# Patient Record
Sex: MALE | Race: WHITE | NOT HISPANIC OR LATINO | Employment: OTHER | URBAN - METROPOLITAN AREA
[De-identification: names, ages, dates, MRNs, and addresses within clinical notes are randomized per-mention and may not be internally consistent; named-entity substitution may affect disease eponyms.]

---

## 2017-01-31 ENCOUNTER — OFFICE VISIT (OUTPATIENT)
Dept: FAMILY MEDICINE CLINIC | Facility: CLINIC | Age: 57
End: 2017-01-31

## 2017-01-31 VITALS
HEART RATE: 93 BPM | DIASTOLIC BLOOD PRESSURE: 82 MMHG | SYSTOLIC BLOOD PRESSURE: 118 MMHG | HEIGHT: 68 IN | BODY MASS INDEX: 29.61 KG/M2 | OXYGEN SATURATION: 98 % | WEIGHT: 195.4 LBS

## 2017-01-31 DIAGNOSIS — M54.41 CHRONIC RIGHT-SIDED LOW BACK PAIN WITH RIGHT-SIDED SCIATICA: ICD-10-CM

## 2017-01-31 DIAGNOSIS — I10 ESSENTIAL HYPERTENSION: Primary | ICD-10-CM

## 2017-01-31 DIAGNOSIS — G89.29 CHRONIC RIGHT-SIDED LOW BACK PAIN WITH RIGHT-SIDED SCIATICA: ICD-10-CM

## 2017-01-31 PROCEDURE — 99214 OFFICE O/P EST MOD 30 MIN: CPT | Performed by: INTERNAL MEDICINE

## 2017-01-31 RX ORDER — MELOXICAM 15 MG/1
15 TABLET ORAL DAILY
Qty: 30 TABLET | Refills: 0 | Status: SHIPPED | OUTPATIENT
Start: 2017-01-31 | End: 2017-02-28 | Stop reason: SDUPTHER

## 2017-01-31 NOTE — MR AVS SNAPSHOT
William Choi   1/31/2017 2:20 PM   Office Visit    Dept Phone:  [phone]   Encounter #:  [id number]    Provider:  Jonathan Brasher MD   Department:  [facility] INTERNAL MEDICINE                Your Full Care Plan              Today's Medication Changes          These changes are accurate as of: 1/31/17  3:18 PM.  If you have any questions, ask your nurse or doctor.               New Medication(s)Ordered:     meloxicam 15 MG tablet   Commonly known as:  MOBIC   Take 1 tablet by mouth Daily.   Started by:  Jonathan Brasher MD            Where to Get Your Medications      These medications were sent to [facility]/pharmacy #[id number] - [place], [place] - [address] [phone] [facility] [phone]   [address] [address]     Phone:  [phone]     meloxicam 15 MG tablet                  Your Updated Medication List          This list is accurate as of: 1/31/17  3:18 PM.  Always use your most recent med list.                amLODIPine 10 MG tablet   Commonly known as:  NORVASC   Take 1 tablet by mouth daily.       meloxicam 15 MG tablet   Commonly known as:  MOBIC   Take 1 tablet by mouth Daily.               You Were Diagnosed With        Codes Comments    Essential hypertension    -  Primary ICD-10-CM: I10  ICD-9-CM: 401.9     Chronic right-sided low back pain with right-sided sciatica     ICD-10-CM: M54.41, G89.29  ICD-9-CM: 724.2, 724.3, 338.29 Stretches as demonstrated today. MELOXICAM 15 mg daily for a month.  Continue the stretches thereafter.      Instructions     None    Patient Instructions History      Upcoming Appointments     Visit Type Date Time Department    OFFICE VISIT 1/31/2017  2:20 PM [facility]      [employer] Signup     Our records indicate that you have an active [employer] account.    You can view your After Visit Summary by going to [email or web address] and logging in with your [employer]  "username and password.  If you don't have a [employer] username and password but a parent or guardian has access to your record, the parent or guardian should login with their own [employer] username and password and access your record to view the After Visit Summary.    If you have questions, you can email [email or web address] or call [phone] to talk to our [employer] staff.  Remember, [employer] is NOT to be used for urgent needs.  For medical emergencies, dial 911.               Other Info from Your Visit           Allergies     No Known Allergies      Reason for Visit     Hypertension 8 month f/u    Leg Pain Right leg pain      Vital Signs     Blood Pressure Pulse Height Weight Oxygen Saturation Body Mass Index    118/82 (BP Location: Right arm, Patient Position: Sitting, Cuff Size: Adult) 93 68\" (172.7 cm) 195 lb 6.4 oz (88.6 kg) 98% 29.71 kg/m2    Smoking Status                   Never Smoker           Problems and Diagnoses Noted     High blood pressure    Chronic right-sided low back pain with right-sided sciatica            "

## 2017-02-28 DIAGNOSIS — M54.41 CHRONIC RIGHT-SIDED LOW BACK PAIN WITH RIGHT-SIDED SCIATICA: ICD-10-CM

## 2017-02-28 DIAGNOSIS — G89.29 CHRONIC RIGHT-SIDED LOW BACK PAIN WITH RIGHT-SIDED SCIATICA: ICD-10-CM

## 2017-02-28 RX ORDER — MELOXICAM 15 MG/1
TABLET ORAL
Qty: 30 TABLET | Refills: 1 | Status: SHIPPED | OUTPATIENT
Start: 2017-02-28 | End: 2017-07-31

## 2017-07-31 ENCOUNTER — OFFICE VISIT (OUTPATIENT)
Dept: FAMILY MEDICINE CLINIC | Facility: CLINIC | Age: 57
End: 2017-07-31

## 2017-07-31 VITALS
OXYGEN SATURATION: 97 % | HEART RATE: 84 BPM | BODY MASS INDEX: 29.77 KG/M2 | SYSTOLIC BLOOD PRESSURE: 122 MMHG | WEIGHT: 196.4 LBS | DIASTOLIC BLOOD PRESSURE: 84 MMHG | HEIGHT: 68 IN

## 2017-07-31 DIAGNOSIS — Z12.5 PROSTATE CANCER SCREENING: ICD-10-CM

## 2017-07-31 DIAGNOSIS — I10 ESSENTIAL HYPERTENSION: Primary | ICD-10-CM

## 2017-07-31 DIAGNOSIS — R31.29 HEMATURIA, MICROSCOPIC: ICD-10-CM

## 2017-07-31 DIAGNOSIS — R35.1 NOCTURIA: ICD-10-CM

## 2017-07-31 LAB
BILIRUB BLD-MCNC: NEGATIVE MG/DL
CLARITY, POC: CLEAR
COLOR UR: ABNORMAL
GLUCOSE UR STRIP-MCNC: NEGATIVE MG/DL
KETONES UR QL: NEGATIVE
LEUKOCYTE EST, POC: NEGATIVE
NITRITE UR-MCNC: NEGATIVE MG/ML
PH UR: 5.5 [PH] (ref 5–8)
PROT UR STRIP-MCNC: NEGATIVE MG/DL
RBC # UR STRIP: ABNORMAL /UL
SP GR UR: 1.02 (ref 1–1.03)
UROBILINOGEN UR QL: NORMAL

## 2017-07-31 PROCEDURE — 99214 OFFICE O/P EST MOD 30 MIN: CPT | Performed by: INTERNAL MEDICINE

## 2017-07-31 PROCEDURE — 81003 URINALYSIS AUTO W/O SCOPE: CPT | Performed by: INTERNAL MEDICINE

## 2017-07-31 NOTE — PROGRESS NOTES
"Chief Complaint   Patient presents with   • Hypertension     6 month f/u   • Blood in Urine     Found at DOT physical [employer]     HTN: Noted to have HTN at his DOT physical in 4/2016.  Amlodipine was started, has been tolerated, and has worked.    Hematuria: microscopic hematuria was noted at his DOT exam in late April. He denies any gross hematuria.  He denies rectal pain.  He denies any abdominal pain.  He does have intermittent urinary urgency and nocturia ×1.  He denies incontinence.  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    Her history is significant for prostate cancer in his father.    Current Outpatient Prescriptions:   •  amLODIPine (NORVASC) 10 MG tablet, Take 1 tablet by mouth daily., Disp: 90 tablet, Rfl: 3  No Known Allergies    He  reports that he has never smoked. He has never used smokeless tobacco. He reports that he drinks about 1.2 oz of alcohol per week  He reports that he does not use illicit drugs.    ROS: mild LLE edema (late-day); intermittent calf cramps; nocturia x 1; no irritative voiding symptoms and no incontinence.  No urinary hesitancy.  No TIA/CVA symptoms.  No angina or claudication.  No fever or chills.  No weight loss.    /84 (BP Location: Right arm, Patient Position: Sitting, Cuff Size: Adult)  Pulse 84  Ht 68\" (172.7 cm)  Wt 196 lb 6.4 oz (89.1 kg)  SpO2 97%  BMI 29.86 kg/m2    EXAM:    Well-developed, well nourished, in no acute distress.  Sclerae are anicteric and the conjunctivae are pink.  No thyromegaly or mass.  No carotid bruit.  No cervical, supraclavicular, or inguinal lymphadenopathy.  Regular rate and rhythm without murmur.  No S3 or S4.  Abdomen is mildly protuberant, but soft, nondistended, with normoactive bowel sounds and no abdominal bruit.  There is no evidence of hepatosplenomegaly or mass.  No CVA tenderness.  No palpable masses in the costovertebral angles.  Trace, nonpitting lower " extremity edema bilaterally.  Pedal pulses are full bilaterally.  No external hemorrhoids.  Normal sphincter tone.  Prostate is 2+ and nodular bilaterally.  He is nontender with rectal exam.     was seen today for hypertension and blood in urine.    Diagnoses and all orders for this visit:    Essential hypertension  -     Comprehensive Metabolic Panel    Hematuria, microscopic  -     POC Urinalysis Dipstick, Automated  -     Urinalysis, Microscopic Only  -     Urine Culture  -     Comprehensive Metabolic Panel  -     CT Abdomen Pelvis With & Without Contrast; Future    Nocturia    Prostate cancer screening  -     PSA      Continue the same dose of amlodipine.    Check the labs as ordered above.    Urinalysis today shows evidence of blood, but the rest of the UA is negative.  I await the additional microscopic and culture results.  Next    We will set in motion a CT scan of the abdomen and pelvis for evaluation of persistent hematuria.  We may ultimately need to refer him to urologist for cystoscopy as well.

## 2017-08-01 LAB
ALBUMIN SERPL-MCNC: 4.1 G/DL (ref 3.5–5.5)
ALBUMIN/GLOB SERPL: 1.5 {RATIO} (ref 1.2–2.2)
ALP SERPL-CCNC: 51 IU/L (ref 39–117)
ALT SERPL-CCNC: 23 IU/L (ref 0–44)
AST SERPL-CCNC: 26 IU/L (ref 0–40)
BILIRUB SERPL-MCNC: 0.4 MG/DL (ref 0–1.2)
BUN SERPL-MCNC: 18 MG/DL (ref 6–24)
BUN/CREAT SERPL: 17 (ref 9–20)
CALCIUM SERPL-MCNC: 8.8 MG/DL (ref 8.7–10.2)
CHLORIDE SERPL-SCNC: 99 MMOL/L (ref 96–106)
CO2 SERPL-SCNC: 22 MMOL/L (ref 18–29)
CREAT SERPL-MCNC: 1.06 MG/DL (ref 0.76–1.27)
GLOBULIN SER CALC-MCNC: 2.7 G/DL (ref 1.5–4.5)
GLUCOSE SERPL-MCNC: 120 MG/DL (ref 65–99)
POTASSIUM SERPL-SCNC: 4.1 MMOL/L (ref 3.5–5.2)
PROT SERPL-MCNC: 6.8 G/DL (ref 6–8.5)
PSA SERPL-MCNC: 0.7 NG/ML (ref 0–4)
SODIUM SERPL-SCNC: 138 MMOL/L (ref 134–144)

## 2017-08-02 ENCOUNTER — TELEPHONE (OUTPATIENT)
Dept: FAMILY MEDICINE CLINIC | Facility: CLINIC | Age: 57
End: 2017-08-02

## 2017-08-02 LAB
BACTERIA #/AREA URNS HPF: ABNORMAL /[HPF]
BACTERIA UR CULT: NO GROWTH
BACTERIA UR CULT: NORMAL
CRYSTALS URNS MICRO: ABNORMAL
EPI CELLS #/AREA URNS HPF: ABNORMAL /HPF
MUCOUS THREADS URNS QL MICRO: PRESENT
RBC #/AREA URNS HPF: ABNORMAL /HPF
UNIDENT CRYS URNS QL MICRO: PRESENT
WBC #/AREA URNS HPF: ABNORMAL /HPF

## 2017-08-02 NOTE — TELEPHONE ENCOUNTER
----- Message from Lisette Salgado MA sent at 8/2/2017  2:28 PM EDT -----  Regarding: FW: Patient results/ question  Contact: [phone]      ----- Message -----     From: Cheri Norfolk     Sent: 8/2/2017  11:42 AM       To: Lisette Salgado MA  Subject: Patient results/ question                        Patient called, would like lab results.   There is also imagining to be done. He would like to know why.    Thank you.

## 2017-08-03 DIAGNOSIS — E78.5 DYSLIPIDEMIA: Primary | ICD-10-CM

## 2017-08-09 DIAGNOSIS — I10 ESSENTIAL HYPERTENSION: ICD-10-CM

## 2017-08-09 RX ORDER — AMLODIPINE BESYLATE 10 MG/1
TABLET ORAL
Qty: 90 TABLET | Refills: 3 | Status: SHIPPED | OUTPATIENT
Start: 2017-08-09 | End: 2018-09-16 | Stop reason: SDUPTHER

## 2018-02-01 ENCOUNTER — OFFICE VISIT (OUTPATIENT)
Dept: FAMILY MEDICINE CLINIC | Facility: CLINIC | Age: 58
End: 2018-02-01

## 2018-02-01 VITALS
DIASTOLIC BLOOD PRESSURE: 76 MMHG | OXYGEN SATURATION: 99 % | HEIGHT: 68 IN | WEIGHT: 186 LBS | SYSTOLIC BLOOD PRESSURE: 114 MMHG | BODY MASS INDEX: 28.19 KG/M2 | HEART RATE: 91 BPM

## 2018-02-01 DIAGNOSIS — R31.29 HEMATURIA, MICROSCOPIC: ICD-10-CM

## 2018-02-01 DIAGNOSIS — I10 ESSENTIAL HYPERTENSION: Primary | ICD-10-CM

## 2018-02-01 DIAGNOSIS — J06.9 ACUTE URI: ICD-10-CM

## 2018-02-01 PROCEDURE — 99213 OFFICE O/P EST LOW 20 MIN: CPT | Performed by: INTERNAL MEDICINE

## 2018-02-01 NOTE — PATIENT INSTRUCTIONS
Mucinex 1200 mg twice a day with a full glass of water.  Delsym 2 teaspoons twice a day as needed for cough.

## 2018-02-01 NOTE — PROGRESS NOTES
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  Amlodipine was started, has been tolerated, and has worked.     Hematuria: microscopic hematuria was noted at his DOT exam in late April. He denies any gross hematuria.  He denies rectal pain.  He denies any abdominal pain.  He does have intermittent urinary urgency and nocturia ×1.  He denies incontinence.  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  CT scan in July, 2017 revealed peripelvic renal cysts bilaterally, but no calculi or masses.  Bladder was normal appearing.  Prostate was mildly enlarged.     His history is significant for prostate cancer in his father.    One week ago, he developed the acute onset of chest congestion.  The onset was following his going to the hospital with his mother in law.  Non-productie cough.  No head congestion.     Current Outpatient Prescriptions:   •  amLODIPine (NORVASC) 10 MG tablet, Take 1 tablet by mouth daily., Disp: 90 tablet, Rfl: 3    No Known Allergies    He  reports that he has never smoked. He has never used smokeless tobacco. He reports that he drinks about 1.2 oz of alcohol per week  He reports that he does not use illicit drugs.     ROS: mild LLE edema (late-day); intermittent calf cramps; nocturia x 1; no irritative voiding symptoms and no incontinence.  No urinary hesitancy.  No TIA/CVA symptoms.  No angina or claudication.  No fever or chills.  No weight loss.    /76 (BP Location: Left arm, Patient Position: Sitting, Cuff Size: Adult)  Pulse 91  Ht 172.7 cm (68\")  Wt 84.4 kg (186 lb)  SpO2 99%  BMI 28.28 kg/m2    Well-developed, well-nourished, in no acute distress.  Sclerae are anicteric and the conjunctivae are pink.  Oropharynx is unremarkable.  No cervical or supraclavicular lymphadenopathy.  Regular rate and rhythm.  No murmur.  No carotid bruit.  Clear to auscultation " bilaterally with good breath sounds throughout all lung fields.  No adventitious breath sounds.  No wheezes noted.  No abdominal bruit.  No lower extremity edema and pedal pulses are full bilaterally.         was seen today for hypertension, blood in urine and cough.    Diagnoses and all orders for this visit:    Essential hypertension    Hematuria, microscopic    Acute URI  Blood pressure is well controlled.  No change in amlodipine.    CT results were reviewed with the patient.  There were renal cysts, but no other abnormalities.  This will be rechecked with his DOT physical later this spring.  We will take a look at his renal function with blood work at a urine study when we see him back in August.    See patient instructions for recommendations for his upper respiratory infection.  He was given a prescription for Zithromax to take while on his upcoming trip if his symptoms worsen.  If systemic symptoms develop, he will call us, and we will change that ABX to Levaquin.

## 2018-06-28 ENCOUNTER — OFFICE VISIT (OUTPATIENT)
Dept: FAMILY MEDICINE CLINIC | Facility: CLINIC | Age: 58
End: 2018-06-28

## 2018-06-28 VITALS
BODY MASS INDEX: 28.26 KG/M2 | DIASTOLIC BLOOD PRESSURE: 68 MMHG | SYSTOLIC BLOOD PRESSURE: 112 MMHG | HEIGHT: 68 IN | OXYGEN SATURATION: 96 % | HEART RATE: 117 BPM | WEIGHT: 186.5 LBS

## 2018-06-28 DIAGNOSIS — M72.2 PLANTAR FASCIITIS OF LEFT FOOT: ICD-10-CM

## 2018-06-28 DIAGNOSIS — B35.1 ONYCHOMYCOSIS: ICD-10-CM

## 2018-06-28 DIAGNOSIS — J02.9 ACUTE PHARYNGITIS, UNSPECIFIED ETIOLOGY: Primary | ICD-10-CM

## 2018-06-28 PROCEDURE — 99214 OFFICE O/P EST MOD 30 MIN: CPT | Performed by: INTERNAL MEDICINE

## 2018-06-28 NOTE — PROGRESS NOTES
"Subjective   William Choi is a 58 y.o. male who presents today for:    Sore Throat (x 5-6 days - swollen uvula) and Pain (left heel)    History of Present Illness     Acute onset of posterior sore throat approximately 5 days ago that has persisted this week.  He has been taking Tylenol with temporary benefit.  He felt his uvula was swelling and beginning to affect his airway.    Mr. Choi  reports that he has never smoked. He has never used smokeless tobacco. He reports that he drinks about 1.2 oz of alcohol per week . He reports that he does not use drugs.     No Known Allergies    Current Outpatient Prescriptions:   •  amLODIPine (NORVASC) 10 MG tablet, TAKE 1 TABLET BY MOUTH DAILY., Disp: 90 tablet, Rfl: 3  •  Multiple Vitamins-Minerals (MENS 50+ MULTI VITAMIN/MIN PO), Take 1 tablet by mouth., Disp: , Rfl:       Review of Systems   Constitutional: Negative for chills and fever.   HENT: Positive for sore throat and voice change. Negative for postnasal drip.    Respiratory: Negative for cough.    Musculoskeletal: Negative for arthralgias and myalgias.         Objective   Vitals:    06/28/18 1457   BP: 112/68   BP Location: Right arm   Patient Position: Sitting   Cuff Size: Adult   Pulse: 117   SpO2: 96%   Weight: 84.6 kg (186 lb 8 oz)   Height: 172.7 cm (68\")     Physical Exam    Well-developed, well-nourished, in no acute distress.  Oropharynx shows moderate erythema and mild edema of the uvula with erythema of the aryepiglottic folds.  There are a few aphthous ulcerations on the uvula.  Tonsils are normal.  Tongue is coated but otherwise normal.  No cervical or supraclavicular lymphadenopathy appreciated.  Regular rate and rhythm.  Heart rate recheck: 88 bpm.   Dystrophic right great toenail consistent with fungal infection.  Tender at the anterior aspect of the left calcaneus.  No bruits appreciated.  No other abnormalities of the left foot or ankle appreciated.           was seen today for sore " throat and pain.    Diagnoses and all orders for this visit:    Acute pharyngitis, unspecified etiology  Comments:  Advil or Aleve for the inflammation.  Try the magic mouthwash for the symptoms.    Plantar fasciitis of left foot  Comments:  Stretches as demonstrated.  Ice for flares.    Onychomycosis  Comments:  Hold off the Rx for now; try OTC remedies.

## 2018-07-17 RX ORDER — AZITHROMYCIN 250 MG/1
TABLET, FILM COATED ORAL
Qty: 6 TABLET | Refills: 0 | Status: SHIPPED | OUTPATIENT
Start: 2018-07-17 | End: 2018-07-31

## 2018-07-17 RX ORDER — METHYLPREDNISOLONE 4 MG/1
TABLET ORAL
Qty: 21 TABLET | Refills: 0 | Status: SHIPPED | OUTPATIENT
Start: 2018-07-17 | End: 2018-07-31

## 2018-07-18 DIAGNOSIS — Z12.5 PROSTATE CANCER SCREENING: ICD-10-CM

## 2018-07-18 DIAGNOSIS — Z11.59 ENCOUNTER FOR HEPATITIS C SCREENING TEST FOR LOW RISK PATIENT: ICD-10-CM

## 2018-07-18 DIAGNOSIS — Z00.00 ROUTINE GENERAL MEDICAL EXAMINATION AT A HEALTH CARE FACILITY: Primary | ICD-10-CM

## 2018-07-22 ENCOUNTER — RESULTS ENCOUNTER (OUTPATIENT)
Dept: FAMILY MEDICINE CLINIC | Facility: CLINIC | Age: 58
End: 2018-07-22

## 2018-07-22 DIAGNOSIS — Z00.00 ROUTINE GENERAL MEDICAL EXAMINATION AT A HEALTH CARE FACILITY: ICD-10-CM

## 2018-07-22 DIAGNOSIS — Z11.59 ENCOUNTER FOR HEPATITIS C SCREENING TEST FOR LOW RISK PATIENT: ICD-10-CM

## 2018-07-22 DIAGNOSIS — Z12.5 PROSTATE CANCER SCREENING: ICD-10-CM

## 2018-07-31 ENCOUNTER — OFFICE VISIT (OUTPATIENT)
Dept: FAMILY MEDICINE CLINIC | Facility: CLINIC | Age: 58
End: 2018-07-31

## 2018-07-31 VITALS
TEMPERATURE: 98.5 F | OXYGEN SATURATION: 97 % | BODY MASS INDEX: 28.34 KG/M2 | WEIGHT: 187 LBS | HEART RATE: 96 BPM | HEIGHT: 68 IN | DIASTOLIC BLOOD PRESSURE: 92 MMHG | SYSTOLIC BLOOD PRESSURE: 124 MMHG

## 2018-07-31 DIAGNOSIS — R06.2 WHEEZE: ICD-10-CM

## 2018-07-31 DIAGNOSIS — R05.9 COUGH: Primary | ICD-10-CM

## 2018-07-31 PROCEDURE — 71046 X-RAY EXAM CHEST 2 VIEWS: CPT | Performed by: INTERNAL MEDICINE

## 2018-07-31 PROCEDURE — 99213 OFFICE O/P EST LOW 20 MIN: CPT | Performed by: INTERNAL MEDICINE

## 2018-07-31 RX ORDER — LEVOFLOXACIN 750 MG/1
750 TABLET ORAL DAILY
Qty: 7 TABLET | Refills: 0 | Status: SHIPPED | OUTPATIENT
Start: 2018-07-31 | End: 2020-11-18

## 2018-07-31 NOTE — PATIENT INSTRUCTIONS
Mucinex 1200 mg twice day with a full glass of water.    Stiolto inhaler-- 2 puffs once a day    Levofloxacin (antibiotic) once a day (NO milk or Multivitamins with it).

## 2018-07-31 NOTE — PROGRESS NOTES
"Chief Complaint   Patient presents with   • Cough       He was treated for an acute pharyngitis at the end of June, but his symptoms persisted.  We put him on a Z-Nathaniel and Medrol Dosepak 7/17/18 for persistent symptoms that also included a cough at that time.  These prescriptions did not help his cough to any great degree.  He now has a cough that is somewhat productive of yellow sputum.    Review of systems is positive for wheezing, cough.  No fever or chills.  No significant shortness of breath.  No head congestion or sore throat.      Current Outpatient Prescriptions:   •  amLODIPine (NORVASC) 10 MG tablet, TAKE 1 TABLET BY MOUTH DAILY., Disp: 90 tablet, Rfl: 3  •  Multiple Vitamins-Minerals (MENS 50+ MULTI VITAMIN/MIN PO), Take 1 tablet by mouth., Disp: , Rfl:     No Known Allergies    He  reports that he has never smoked. He has never used smokeless tobacco. He reports that he drinks about 1.2 oz of alcohol per week . He reports that he does not use drugs.      /92 (BP Location: Right arm, Patient Position: Sitting, Cuff Size: Large Adult)   Pulse 96   Temp 98.5 °F (36.9 °C) (Oral)   Ht 172.7 cm (68\")   Wt 84.8 kg (187 lb)   SpO2 97%   BMI 28.43 kg/m²     EXAM:    Well-developed, well-nourished, in no acute distress.  No cervical, supraclavicular, or axillary lymphadenopathy.  Regular rate and rhythm.  No murmur.  Wheezes bilaterally.  Rhonchi bilaterally.  Normal respiratory effort noted.  Oropharynx shows cobblestoning but no erythema.     was seen today for cough.    Diagnoses and all orders for this visit:    Cough  -     XR Chest PA & Lateral    Wheeze  -     XR Chest PA & Lateral    Other orders  -     levoFLOXacin (LEVAQUIN) 750 MG tablet; Take 1 tablet by mouth Daily.    2 images of the chest were obtained today.  The PA views are fairly clear, except for a small, well calcified nodule at the left base.  On the lateral views, there appears to be a posterior infiltrate.  Therefore, we " will treat with the Levaquin.  I await the radiologist's final interpretation.    Otherwise, see patient instructions.

## 2018-09-16 DIAGNOSIS — I10 ESSENTIAL HYPERTENSION: ICD-10-CM

## 2018-09-18 LAB
ALBUMIN SERPL-MCNC: 4.4 G/DL (ref 3.5–5.2)
ALBUMIN/GLOB SERPL: 1.9 G/DL
ALP SERPL-CCNC: 49 U/L (ref 39–117)
ALT SERPL-CCNC: 23 U/L (ref 1–41)
APPEARANCE UR: CLEAR
AST SERPL-CCNC: 20 U/L (ref 1–40)
BILIRUB SERPL-MCNC: 0.5 MG/DL (ref 0.1–1.2)
BILIRUB UR QL STRIP: NEGATIVE
BUN SERPL-MCNC: 17 MG/DL (ref 6–20)
BUN/CREAT SERPL: 16.7 (ref 7–25)
CALCIUM SERPL-MCNC: 8.9 MG/DL (ref 8.6–10.5)
CHLORIDE SERPL-SCNC: 102 MMOL/L (ref 98–107)
CHOLEST SERPL-MCNC: 185 MG/DL (ref 0–200)
CO2 SERPL-SCNC: 27.2 MMOL/L (ref 22–29)
COLOR UR: YELLOW
CREAT SERPL-MCNC: 1.02 MG/DL (ref 0.76–1.27)
ERYTHROCYTE [DISTWIDTH] IN BLOOD BY AUTOMATED COUNT: 12.9 % (ref 11.5–14.5)
GLOBULIN SER CALC-MCNC: 2.3 GM/DL
GLUCOSE SERPL-MCNC: 102 MG/DL (ref 65–99)
GLUCOSE UR QL: NEGATIVE
HCT VFR BLD AUTO: 44.8 % (ref 40.4–52.2)
HCV AB S/CO SERPL IA: <0.1 S/CO RATIO (ref 0–0.9)
HDLC SERPL-MCNC: 42 MG/DL (ref 40–60)
HGB BLD-MCNC: 15.1 G/DL (ref 13.7–17.6)
HGB UR QL STRIP: NEGATIVE
KETONES UR QL STRIP: ABNORMAL
LDLC SERPL CALC-MCNC: 89 MG/DL (ref 0–100)
LEUKOCYTE ESTERASE UR QL STRIP: NEGATIVE
MCH RBC QN AUTO: 31.7 PG (ref 27–32.7)
MCHC RBC AUTO-ENTMCNC: 33.7 G/DL (ref 32.6–36.4)
MCV RBC AUTO: 93.9 FL (ref 79.8–96.2)
NITRITE UR QL STRIP: NEGATIVE
PH UR STRIP: 5.5 [PH] (ref 5–8)
PLATELET # BLD AUTO: 167 10*3/MM3 (ref 140–500)
POTASSIUM SERPL-SCNC: 4.1 MMOL/L (ref 3.5–5.2)
PROT SERPL-MCNC: 6.7 G/DL (ref 6–8.5)
PROT UR QL STRIP: ABNORMAL
PSA SERPL-MCNC: 0.8 NG/ML (ref 0–4)
RBC # BLD AUTO: 4.77 10*6/MM3 (ref 4.6–6)
SODIUM SERPL-SCNC: 140 MMOL/L (ref 136–145)
SP GR UR: 1.03 (ref 1–1.03)
TRIGL SERPL-MCNC: 271 MG/DL (ref 0–150)
UROBILINOGEN UR STRIP-MCNC: ABNORMAL MG/DL
VLDLC SERPL CALC-MCNC: 54.2 MG/DL (ref 5–40)
WBC # BLD AUTO: 5.98 10*3/MM3 (ref 4.5–10.7)

## 2018-09-18 RX ORDER — AMLODIPINE BESYLATE 10 MG/1
TABLET ORAL
Qty: 90 TABLET | Refills: 3 | Status: SHIPPED | OUTPATIENT
Start: 2018-09-18 | End: 2020-11-18 | Stop reason: SDUPTHER

## 2020-11-18 ENCOUNTER — OFFICE VISIT (OUTPATIENT)
Dept: FAMILY MEDICINE CLINIC | Facility: CLINIC | Age: 60
End: 2020-11-18

## 2020-11-18 VITALS
SYSTOLIC BLOOD PRESSURE: 142 MMHG | WEIGHT: 181 LBS | HEART RATE: 86 BPM | TEMPERATURE: 98.4 F | DIASTOLIC BLOOD PRESSURE: 88 MMHG | OXYGEN SATURATION: 98 % | BODY MASS INDEX: 27.43 KG/M2 | HEIGHT: 68 IN

## 2020-11-18 DIAGNOSIS — R10.13 EPIGASTRIC PAIN: Primary | ICD-10-CM

## 2020-11-18 DIAGNOSIS — I10 ESSENTIAL HYPERTENSION: ICD-10-CM

## 2020-11-18 PROCEDURE — 99214 OFFICE O/P EST MOD 30 MIN: CPT | Performed by: NURSE PRACTITIONER

## 2020-11-18 RX ORDER — AMLODIPINE BESYLATE 10 MG/1
10 TABLET ORAL DAILY
Qty: 90 TABLET | Refills: 1 | Status: SHIPPED | OUTPATIENT
Start: 2020-11-18

## 2020-11-18 NOTE — PROGRESS NOTES
Subjective   William Choi is a 60 y.o. male.     Chief Complaint   Patient presents with   • Pain     under rib when laying down   • Hypertension     Pain  This is a new problem. The current episode started in the past 7 days. The problem occurs intermittently. The problem has been waxing and waning. Associated symptoms include fatigue. Pertinent negatives include no abdominal pain, chills, nausea or vomiting. Exacerbated by: he thinks his late night eating is aggravating him. Treatments tried: TUMS. The treatment provided no relief.        I have reviewed the patient's medical history in detail and updated the computerized patient record.    The following portions of the patient's history were reviewed and updated as appropriate: allergies, current medications, past family history, past medical history, past social history, past surgical history and problem list.      Current Outpatient Medications:   •  amLODIPine (NORVASC) 10 MG tablet, TAKE 1 TABLET BY MOUTH DAILY., Disp: 90 tablet, Rfl: 3  •  Multiple Vitamins-Minerals (MENS 50+ MULTI VITAMIN/MIN PO), Take 1 tablet by mouth Daily., Disp: , Rfl:      Review of Systems   Constitutional: Positive for fatigue. Negative for chills.   Respiratory: Negative.    Cardiovascular: Negative.    Gastrointestinal: Negative for abdominal distention, abdominal pain, nausea, vomiting, GERD and indigestion.   Genitourinary: Negative.    Musculoskeletal: Negative.    Neurological: Negative.    Psychiatric/Behavioral: Negative.        Objective    Vitals:    11/18/20 1148   BP: 142/88   Pulse: 86   Temp: 98.4 °F (36.9 °C)   SpO2: 98%     Body mass index is 27.52 kg/m².     Physical Exam  Vitals signs reviewed.   Constitutional:       Appearance: Normal appearance.   Cardiovascular:      Rate and Rhythm: Normal rate and regular rhythm.      Pulses: Normal pulses.      Heart sounds: Normal heart sounds.   Pulmonary:      Effort: Pulmonary effort is normal.      Breath sounds:  Normal breath sounds.   Abdominal:      General: Bowel sounds are decreased.      Palpations: Abdomen is soft. There is no hepatomegaly or splenomegaly.      Tenderness: There is abdominal tenderness in the epigastric area. Negative signs include Tolentino's sign and McBurney's sign.   Skin:     General: Skin is warm and dry.   Neurological:      Mental Status: He is alert and oriented to person, place, and time.           Assessment/Plan   Diagnoses and all orders for this visit:    1. Epigastric pain (Primary)  -     US Gallbladder      Mr. Choi presents with complaints of epigastric pain that occurs during the early morning hours and wakes him from his sleep. Yesterday he reported a severe episode of pain. He does still have his gallbladder. He does have tenderness with palpation in the epigastric quadrant of his abdomin.   I will send him for an ultrasound of his gallbladder for further evaluation.  I have advised him to monitor the foods he is eating to see if any particular food triggers an episode.   Follow up in 6 months for an annual physical exam with labs the week before.

## 2020-11-18 NOTE — TELEPHONE ENCOUNTER
Caller: William Choi    Relationship: Self    Best call back number: [phone]    Medication needed:   Requested Prescriptions     Pending Prescriptions Disp Refills   • amLODIPine (NORVASC) 10 MG tablet 90 tablet 3     Sig: Take 1 tablet by mouth Daily.       When do you need the refill by:     What details did the patient provide when requesting the medication: 11/19/20    Does the patient have less than a 3 day supply:  [x] Yes  [] No    What is the patient's preferred pharmacy: [facility]/PHARMACY #[id number] [place], [place] - [address] [phone] [facility] [phone] FX

## 2020-11-20 ENCOUNTER — HOSPITAL ENCOUNTER (OUTPATIENT)
Dept: ULTRASOUND IMAGING | Facility: HOSPITAL | Age: 60
Discharge: HOME | End: 2020-11-20
Admitting: NURSE PRACTITIONER

## 2020-11-20 PROCEDURE — 76705 ECHO EXAM OF ABDOMEN: CPT

## 2020-11-23 ENCOUNTER — TELEPHONE (OUTPATIENT)
Dept: FAMILY MEDICINE CLINIC | Facility: CLINIC | Age: 60
End: 2020-11-23

## 2020-11-23 NOTE — TELEPHONE ENCOUNTER
----- Message from JOANA Loza sent at 11/23/2020 10:30 AM EST -----  Please let him know that the ultrasound of his gallbladder is normal. If he is still having epigastric pain that wakes him at night we can send him for an EGD.

## 2020-11-23 NOTE — PROGRESS NOTES
Please let him know that the ultrasound of his gallbladder is normal. If he is still having epigastric pain that wakes him at night we can send him for an EGD.

## 2020-11-23 NOTE — TELEPHONE ENCOUNTER
Called patient and gave him the results.  Patient stated that he has not had the episode of pain since.  He will monitor his symptoms and call us if they return.

## 2020-12-11 ENCOUNTER — TELEPHONE (OUTPATIENT)
Dept: FAMILY MEDICINE CLINIC | Facility: CLINIC | Age: 60
End: 2020-12-11

## 2020-12-11 DIAGNOSIS — R10.11 RUQ ABDOMINAL PAIN: Primary | ICD-10-CM

## 2020-12-16 ENCOUNTER — OFFICE VISIT (OUTPATIENT)
Dept: FAMILY MEDICINE CLINIC | Facility: CLINIC | Age: 60
End: 2020-12-16

## 2020-12-16 VITALS
HEART RATE: 87 BPM | HEIGHT: 68 IN | WEIGHT: 181 LBS | BODY MASS INDEX: 27.43 KG/M2 | OXYGEN SATURATION: 97 % | TEMPERATURE: 97.3 F | DIASTOLIC BLOOD PRESSURE: 80 MMHG | SYSTOLIC BLOOD PRESSURE: 124 MMHG

## 2020-12-16 DIAGNOSIS — H65.93 FLUID LEVEL BEHIND TYMPANIC MEMBRANE OF BOTH EARS: ICD-10-CM

## 2020-12-16 DIAGNOSIS — H93.13 TINNITUS OF BOTH EARS: Primary | ICD-10-CM

## 2020-12-16 PROCEDURE — 99213 OFFICE O/P EST LOW 20 MIN: CPT | Performed by: NURSE PRACTITIONER

## 2020-12-16 NOTE — PROGRESS NOTES
Subjective   William Choi is a 60 y.o. male.     Chief Complaint   Patient presents with   • Tinnitus     for about 2 weeks     Tinnitus  This is a new problem. The current episode started 1 to 4 weeks ago. The problem occurs constantly. The problem has been unchanged. Pertinent negatives include no congestion, headaches or vertigo. Associated symptoms comments: No hearing loss, no ear fullness. Nothing aggravates the symptoms. He has tried nothing for the symptoms. The treatment provided no relief.        I have reviewed the patient's medical history in detail and updated the computerized patient record.    The following portions of the patient's history were reviewed and updated as appropriate: allergies, current medications, past family history, past medical history, past social history, past surgical history and problem list.      Current Outpatient Medications:   •  amLODIPine (NORVASC) 10 MG tablet, Take 1 tablet by mouth Daily., Disp: 90 tablet, Rfl: 1  •  Multiple Vitamins-Minerals (MENS 50+ MULTI VITAMIN/MIN PO), Take 1 tablet by mouth Daily., Disp: , Rfl:      Review of Systems   Constitutional: Negative.    HENT: Positive for tinnitus. Negative for congestion, ear discharge, ear pain, postnasal drip, rhinorrhea and sinus pressure.    Respiratory: Negative.    Cardiovascular: Negative.    Neurological: Negative.  Negative for dizziness, vertigo, syncope and light-headedness.   Psychiatric/Behavioral: Negative.        Objective    Vitals:    12/16/20 1434   BP: 124/80   Pulse: 87   Temp: 97.3 °F (36.3 °C)   SpO2: 97%     Physical Exam  Vitals signs reviewed.   Constitutional:       Appearance: Normal appearance.   HENT:      Right Ear: Ear canal and external ear normal. A middle ear effusion is present. Tympanic membrane is not erythematous.      Left Ear: Ear canal and external ear normal. A middle ear effusion is present. Tympanic membrane is not erythematous.      Ears:      Munroe exam findings: does  not lateralize.  Neurological:      Mental Status: He is alert.           Assessment/Plan   Diagnoses and all orders for this visit:    1. Tinnitus of both ears (Primary)    2. Fluid level behind tympanic membrane of both ears    Mr. Choi presents today with complaints of ringing in both ears that has been going on for several weeks. He dose not have vertigo or is on any medications which might cause the ringing in his ears. He does have some mid ear effusion without signs of infection. He is to start taking an OTC antihistamine. If his symptoms do not resolve I will send him to an ENT for further evaluation.

## 2020-12-29 ENCOUNTER — TRANSCRIBE ORDERS (OUTPATIENT)
Dept: SLEEP MEDICINE | Facility: HOSPITAL | Age: 60
End: 2020-12-29

## 2020-12-29 ENCOUNTER — OFFICE VISIT (OUTPATIENT)
Dept: GASTROENTEROLOGY | Facility: CLINIC | Age: 60
End: 2020-12-29

## 2020-12-29 VITALS — WEIGHT: 183 LBS | TEMPERATURE: 97.8 F | BODY MASS INDEX: 27.74 KG/M2 | HEIGHT: 68 IN

## 2020-12-29 DIAGNOSIS — R10.13 EPIGASTRIC PAIN: Primary | ICD-10-CM

## 2020-12-29 DIAGNOSIS — Z01.818 OTHER SPECIFIED PRE-OPERATIVE EXAMINATION: Primary | ICD-10-CM

## 2020-12-29 PROCEDURE — 99204 OFFICE O/P NEW MOD 45 MIN: CPT | Performed by: INTERNAL MEDICINE

## 2020-12-29 RX ORDER — PANTOPRAZOLE SODIUM 40 MG/1
40 TABLET, DELAYED RELEASE ORAL DAILY
Qty: 90 TABLET | Refills: 3 | Status: SHIPPED | OUTPATIENT
Start: 2020-12-29

## 2020-12-29 NOTE — PROGRESS NOTES
Chief Complaint   Patient presents with   • Abdominal Pain     William Choi is a 60 y.o. male who presents with a history of epigastric pain  HPI     Patient 60-year-old male with history of essential hypertension here to evaluate abdominal pain.  Patient reports months of recurrent epigastric pain worse first thing in the morning which does seem to improve through the day.  Patient sometimes reports the pain can be severe at times wakes up and just lays there waiting for it to resolve.  Patient has had less severe episodes in the past which she treated with Tums to good effect but the Tums no longer seems to be working.  Patient denies any melena no bright red blood per rectum has not lost weight but still having ongoing symptoms.  Patient reports ultrasound was negative now here for further recommendations.    Past Medical History:   Diagnosis Date   • Essential hypertension 5/3/2016       Current Outpatient Medications:   •  amLODIPine (NORVASC) 10 MG tablet, Take 1 tablet by mouth Daily., Disp: 90 tablet, Rfl: 1  •  Loratadine (CLARITIN PO), Take 1 tablet by mouth Daily., Disp: , Rfl:   •  Multiple Vitamins-Minerals (MENS 50+ MULTI VITAMIN/MIN PO), Take 1 tablet by mouth Daily., Disp: , Rfl:   •  pantoprazole (PROTONIX) 40 MG EC tablet, Take 1 tablet by mouth Daily., Disp: 90 tablet, Rfl: 3  No Known Allergies  Social History     Socioeconomic History   • Marital status:      Spouse name: Not on file   • Number of children: Not on file   • Years of education: Not on file   • Highest education level: Not on file   Tobacco Use   • Smoking status: Never Smoker   • Smokeless tobacco: Never Used   Substance and Sexual Activity   • Alcohol use: Yes     Alcohol/week: 2.0 standard drinks     Types: 2 Cans of beer per week   • Drug use: No     Family History   Problem Relation Age of Onset   • COPD Mother    • Aplastic anemia Father    • Hypertension Father    • Kidney failure Father    • Prostate cancer Father     • Hypertension Brother    • Colon cancer Paternal Great-Grandfather      Review of Systems   Constitutional: Negative.    HENT: Negative.    Eyes: Negative.    Respiratory: Negative.    Cardiovascular: Negative.    Gastrointestinal: Positive for abdominal pain. Negative for abdominal distention, anal bleeding, blood in stool, constipation, diarrhea, nausea, rectal pain and vomiting.   Endocrine: Negative.    Musculoskeletal: Negative.    Skin: Negative.    Allergic/Immunologic: Negative.    Hematological: Negative.      Vitals:    12/29/20 1450   Temp: 97.8 °F (36.6 °C)     Physical Exam  Vitals signs and nursing note reviewed.   Constitutional:       Appearance: Normal appearance. He is well-developed and normal weight.   HENT:      Head: Normocephalic and atraumatic.   Eyes:      General: No scleral icterus.     Conjunctiva/sclera: Conjunctivae normal.      Pupils: Pupils are equal, round, and reactive to light.   Neck:      Musculoskeletal: Normal range of motion and neck supple. No neck rigidity.      Trachea: No tracheal deviation.   Cardiovascular:      Rate and Rhythm: Normal rate and regular rhythm.      Heart sounds: No murmur. No friction rub. No gallop.    Pulmonary:      Effort: Pulmonary effort is normal. No respiratory distress.      Breath sounds: Normal breath sounds. No wheezing or rales.   Abdominal:      General: Bowel sounds are normal. There is no distension.      Palpations: Abdomen is soft. There is no mass.      Tenderness: There is no abdominal tenderness. There is no guarding or rebound.   Musculoskeletal: Normal range of motion.         General: No swelling or tenderness.   Skin:     General: Skin is warm and dry.      Coloration: Skin is not jaundiced.      Findings: No rash.   Neurological:      General: No focal deficit present.      Mental Status: He is alert and oriented to person, place, and time.      Cranial Nerves: No cranial nerve deficit.   Psychiatric:         Behavior:  Behavior normal.         Thought Content: Thought content normal.       Diagnoses and all orders for this visit:    Epigastric pain  -     Case Request; Standing  -     Implement Anesthesia orders day of procedure.; Standing  -     Obtain informed consent; Standing  -     Case Request    Other orders  -     Loratadine (CLARITIN PO); Take 1 tablet by mouth Daily.  -     pantoprazole (PROTONIX) 40 MG EC tablet; Take 1 tablet by mouth Daily.    Patient 60-year-old male with history of hypertension presenting with several months of epigastric pain.  Patient reports usually wakes up in the morning with discomfort but as the day goes on does start to feel little better.  Patient reports used to be treat these discomforts with Tums which resolved his complaints but it no longer seems to work.  Patient denies nausea vomiting no melena or bright red blood per rectum.  Patient otherwise feeling well here for further recommendations.  Patient reports underwent ultrasound of the gallbladder which was normal but has not tried any medication other than the Tums for his symptoms.  For now will give trial Protonix and arrange EGD to evaluate for peptic disease.  We will follow-up clinically based on findings.

## 2021-01-02 ENCOUNTER — LAB (OUTPATIENT)
Dept: LAB | Facility: HOSPITAL | Age: 61
End: 2021-01-02

## 2021-01-02 DIAGNOSIS — Z01.818 OTHER SPECIFIED PRE-OPERATIVE EXAMINATION: ICD-10-CM

## 2021-01-02 LAB — SARS-COV-2 ORF1AB RESP QL NAA+PROBE: NOT DETECTED

## 2021-01-02 PROCEDURE — U0004 COV-19 TEST NON-CDC HGH THRU: HCPCS

## 2021-01-02 PROCEDURE — C9803 HOPD COVID-19 SPEC COLLECT: HCPCS

## 2021-01-04 ENCOUNTER — ANESTHESIA (OUTPATIENT)
Dept: GASTROENTEROLOGY | Facility: HOSPITAL | Age: 61
End: 2021-01-04

## 2021-01-04 ENCOUNTER — ANESTHESIA EVENT (OUTPATIENT)
Dept: GASTROENTEROLOGY | Facility: HOSPITAL | Age: 61
End: 2021-01-04

## 2021-01-04 ENCOUNTER — HOSPITAL ENCOUNTER (OUTPATIENT)
Facility: HOSPITAL | Age: 61
Setting detail: HOSPITAL OUTPATIENT SURGERY
Discharge: HOME | End: 2021-01-04
Attending: INTERNAL MEDICINE | Admitting: INTERNAL MEDICINE

## 2021-01-04 VITALS
RESPIRATION RATE: 16 BRPM | HEART RATE: 69 BPM | HEIGHT: 68 IN | SYSTOLIC BLOOD PRESSURE: 105 MMHG | TEMPERATURE: 98.3 F | OXYGEN SATURATION: 98 % | DIASTOLIC BLOOD PRESSURE: 60 MMHG | WEIGHT: 182.7 LBS | BODY MASS INDEX: 27.69 KG/M2

## 2021-01-04 DIAGNOSIS — R10.13 EPIGASTRIC PAIN: ICD-10-CM

## 2021-01-04 PROCEDURE — 25010000002 PROPOFOL 10 MG/ML EMULSION: Performed by: ANESTHESIOLOGY

## 2021-01-04 PROCEDURE — 88305 TISSUE EXAM BY PATHOLOGIST: CPT | Performed by: INTERNAL MEDICINE

## 2021-01-04 PROCEDURE — 43239 EGD BIOPSY SINGLE/MULTIPLE: CPT | Performed by: INTERNAL MEDICINE

## 2021-01-04 RX ORDER — SODIUM CHLORIDE 0.9 % (FLUSH) 0.9 %
3 SYRINGE (ML) INJECTION EVERY 12 HOURS SCHEDULED
Status: DISCONTINUED | OUTPATIENT
Start: 2021-01-04 | End: 2021-01-04 | Stop reason: HOSPADM

## 2021-01-04 RX ORDER — PROMETHAZINE HYDROCHLORIDE 25 MG/1
25 SUPPOSITORY RECTAL ONCE AS NEEDED
Status: DISCONTINUED | OUTPATIENT
Start: 2021-01-04 | End: 2021-01-04 | Stop reason: HOSPADM

## 2021-01-04 RX ORDER — SODIUM CHLORIDE, SODIUM LACTATE, POTASSIUM CHLORIDE, CALCIUM CHLORIDE 600; 310; 30; 20 MG/100ML; MG/100ML; MG/100ML; MG/100ML
30 INJECTION, SOLUTION INTRAVENOUS CONTINUOUS PRN
Status: DISCONTINUED | OUTPATIENT
Start: 2021-01-04 | End: 2021-01-04 | Stop reason: HOSPADM

## 2021-01-04 RX ORDER — LIDOCAINE HYDROCHLORIDE 20 MG/ML
INJECTION, SOLUTION INFILTRATION; PERINEURAL AS NEEDED
Status: DISCONTINUED | OUTPATIENT
Start: 2021-01-04 | End: 2021-01-04 | Stop reason: SURG

## 2021-01-04 RX ORDER — PROMETHAZINE HYDROCHLORIDE 25 MG/1
25 TABLET ORAL ONCE AS NEEDED
Status: DISCONTINUED | OUTPATIENT
Start: 2021-01-04 | End: 2021-01-04 | Stop reason: HOSPADM

## 2021-01-04 RX ORDER — PROPOFOL 10 MG/ML
VIAL (ML) INTRAVENOUS AS NEEDED
Status: DISCONTINUED | OUTPATIENT
Start: 2021-01-04 | End: 2021-01-04 | Stop reason: SURG

## 2021-01-04 RX ORDER — SODIUM CHLORIDE 0.9 % (FLUSH) 0.9 %
10 SYRINGE (ML) INJECTION AS NEEDED
Status: DISCONTINUED | OUTPATIENT
Start: 2021-01-04 | End: 2021-01-04 | Stop reason: HOSPADM

## 2021-01-04 RX ADMIN — LIDOCAINE HYDROCHLORIDE 60 MG: 20 INJECTION, SOLUTION INFILTRATION; PERINEURAL at 09:59

## 2021-01-04 RX ADMIN — SODIUM CHLORIDE, POTASSIUM CHLORIDE, SODIUM LACTATE AND CALCIUM CHLORIDE 30 ML/HR: 600; 310; 30; 20 INJECTION, SOLUTION INTRAVENOUS at 08:28

## 2021-01-04 RX ADMIN — PROPOFOL 150 MG: 10 INJECTION, EMULSION INTRAVENOUS at 09:59

## 2021-01-04 NOTE — DISCHARGE INSTRUCTIONS
For the next 24 hours patient needs to be with a responsible adult.    For 24 hours DO NOT drive, operate machinery, appliances, drink alcohol, make important decisions or sign legal documents.    Start with a light or bland diet if you are feeling sick to your stomach otherwise advance to regular diet as tolerated.    Follow recommendations on procedure report if provided by your doctor.    Call Dr Hernadez for problems [phone]    Problems may include but not limited to: large amounts of bleeding, trouble breathing, repeated vomiting, severe unrelieved pain, fever or chills.

## 2021-01-04 NOTE — ANESTHESIA POSTPROCEDURE EVALUATION
"Patient: William Choi    Procedure Summary     Date: 01/04/21 Room / Location:  [facility]    Anesthesia Start: 0956 Anesthesia Stop: 1017    Procedure: ESOPHAGOGASTRODUODENOSCOPY with biopsy of gastric polyps, and biopsies, (N/A Esophagus) Diagnosis:       Epigastric pain      Gastritis      Gastric polyps      (Epigastric pain [R10.13])    Surgeon: Carlos Alberto Hernadez MD Provider: Leandro Lora MD    Anesthesia Type: MAC ASA Status: 3          Anesthesia Type: MAC    Vitals  Vitals Value Taken Time   /60 01/04/21 1036   Temp     Pulse 69 01/04/21 1036   Resp 16 01/04/21 1036   SpO2 98 % 01/04/21 1036           Post Anesthesia Care and Evaluation    Patient location during evaluation: PACU  Patient participation: complete - patient participated  Level of consciousness: awake  Pain score: 0  Pain management: adequate  Airway patency: patent  Anesthetic complications: No anesthetic complications  PONV Status: none  Cardiovascular status: acceptable  Respiratory status: acceptable  Hydration status: acceptable    Comments: /60 (BP Location: Left arm, Patient Position: Sitting)   Pulse 69   Temp 36.8 °C (98.3 °F) (Oral)   Resp 16   Ht 172.7 cm (68\")   Wt 82.9 kg (182 lb 11.2 oz)   SpO2 98%   BMI 27.78 kg/m²       "

## 2021-01-04 NOTE — ANESTHESIA PREPROCEDURE EVALUATION
Anesthesia Evaluation     Patient summary reviewed and Nursing notes reviewed   NPO Solid Status: > 8 hours  NPO Liquid Status: > 6 hours           Airway   Mallampati: I  TM distance: >3 FB  Neck ROM: full  No difficulty expected  Dental - normal exam     Pulmonary - negative pulmonary ROS and normal exam   Cardiovascular - normal exam    (+) hypertension,       Neuro/Psych- negative ROS  GI/Hepatic/Renal/Endo - negative ROS     Musculoskeletal (-) negative ROS    Abdominal  - normal exam    Bowel sounds: normal.   Substance History - negative use     OB/GYN negative ob/gyn ROS         Other                      Anesthesia Plan    ASA 3     MAC       Anesthetic plan, all risks, benefits, and alternatives have been provided, discussed and informed consent has been obtained with: patient.

## 2021-01-04 NOTE — BRIEF OP NOTE
ESOPHAGOGASTRODUODENOSCOPY  Progress Note    William Choi  1/4/2021    Pre-op Diagnosis:   Epigastric pain [R10.13]       Post-Op Diagnosis Codes:     * Epigastric pain [R10.13]     * Gastritis [K29.70]     * Gastric polyps [K31.7]    Procedure/CPT® Codes:        Procedure(s):  ESOPHAGOGASTRODUODENOSCOPY with biopsy of gastric polyps, and biopsies,    Surgeon(s):  Carlos Alberto Hernadez MD    Anesthesia: Monitored Anesthesia Care    Staff:   Endo Technician: Corby Hightower, PCT  Endo Nurse: Lala Enriquez RN         Estimated Blood Loss: minimal    Urine Voided: * No values recorded between 1/4/2021  9:56 AM and 1/4/2021 10:13 AM *    Specimens:                Specimens     ID Source Type Tests Collected By Collected At Frozen?      A Gastric, Body Polyp · TISSUE PATHOLOGY EXAM   Carlos Alberto Hernadez MD 1/4/21 1009      Description: gastric polyps    This specimen was not marked as sent.    B Gastric, Antrum Tissue · TISSUE PATHOLOGY EXAM   Carlos Alberto Hernadez MD 1/4/21 1010      This specimen was not marked as sent.                Drains: * No LDAs found *    Findings: EGD with normal esophagus, irregular Z-line with antral gastritis and gastric polyps.  Duodenum was normal throughout.  Biopsies of the antrum as well as gastric polyps were obtained.    Complications: None          Carlos Alberto Hernadez MD     Date: 1/4/2021  Time: 10:16 EST

## 2021-01-05 LAB
CYTO UR: NORMAL
LAB AP CASE REPORT: NORMAL
PATH REPORT.FINAL DX SPEC: NORMAL
PATH REPORT.GROSS SPEC: NORMAL

## 2021-01-15 ENCOUNTER — TELEPHONE (OUTPATIENT)
Dept: FAMILY MEDICINE CLINIC | Facility: CLINIC | Age: 61
End: 2021-01-15

## 2021-01-15 ENCOUNTER — TELEPHONE (OUTPATIENT)
Dept: GASTROENTEROLOGY | Facility: CLINIC | Age: 61
End: 2021-01-15

## 2021-01-15 NOTE — TELEPHONE ENCOUNTER
S/w patient. He stated that he is waiting on a call regarding his EGD results. I let him know that Dr Ho office called him yesterday and left a message for him to call for results. I asked that he give them a call to discuss.

## 2021-01-15 NOTE — TELEPHONE ENCOUNTER
----- Message from Gabriella Harris sent at 1/15/2021  8:30 AM EST -----  Regarding: asking for an call back  Contact: [phone]  Patient left  yesterday for test report ..

## 2021-01-18 ENCOUNTER — TELEPHONE (OUTPATIENT)
Dept: GASTROENTEROLOGY | Facility: CLINIC | Age: 61
End: 2021-01-18

## 2021-01-18 NOTE — TELEPHONE ENCOUNTER
Called pt and advised of Dr. Ho note.  Pt verbalized understanding.     Pt states gastric symptoms are improved at this time.

## 2021-01-18 NOTE — TELEPHONE ENCOUNTER
----- Message from Carlos Alberto Hernadez MD sent at 1/18/2021  3:19 PM EST -----  Mild gastritis found with benign, nonprecancerous polyps in the stomach.  If still symptomatic change PPI

## 2021-03-19 ENCOUNTER — BULK ORDERING (OUTPATIENT)
Dept: CASE MANAGEMENT | Facility: OTHER | Age: 61
End: 2021-03-19

## 2021-03-19 DIAGNOSIS — Z23 IMMUNIZATION DUE: ICD-10-CM

## 2021-04-26 DIAGNOSIS — Z00.00 ROUTINE GENERAL MEDICAL EXAMINATION AT A HEALTH CARE FACILITY: Primary | ICD-10-CM

## 2021-04-26 DIAGNOSIS — Z12.5 SCREENING PSA (PROSTATE SPECIFIC ANTIGEN): ICD-10-CM

## 2021-05-18 ENCOUNTER — TELEPHONE (OUTPATIENT)
Dept: GASTROENTEROLOGY | Facility: CLINIC | Age: 61
End: 2021-05-18

## 2021-05-18 NOTE — TELEPHONE ENCOUNTER
Received referral for pt, tried calling but had to leave a message to schedule appt. I have scanned the referral in the media tab.

## 2021-06-07 DIAGNOSIS — I10 ESSENTIAL HYPERTENSION: ICD-10-CM

## 2021-06-07 RX ORDER — AMLODIPINE BESYLATE 10 MG/1
TABLET ORAL
Qty: 90 TABLET | Refills: 1 | OUTPATIENT
Start: 2021-06-07

## 2021-06-10 ENCOUNTER — OFFICE VISIT (OUTPATIENT)
Dept: GASTROENTEROLOGY | Facility: CLINIC | Age: 61
End: 2021-06-10

## 2021-06-10 ENCOUNTER — TELEPHONE (OUTPATIENT)
Dept: GASTROENTEROLOGY | Facility: CLINIC | Age: 61
End: 2021-06-10

## 2021-06-10 VITALS — WEIGHT: 186.8 LBS | BODY MASS INDEX: 28.31 KG/M2 | HEIGHT: 68 IN

## 2021-06-10 DIAGNOSIS — Z80.0 FAMILY HISTORY OF COLON CANCER: ICD-10-CM

## 2021-06-10 DIAGNOSIS — R10.10 PAIN OF UPPER ABDOMEN: Primary | ICD-10-CM

## 2021-06-10 DIAGNOSIS — Z12.11 SCREENING FOR COLON CANCER: ICD-10-CM

## 2021-06-10 DIAGNOSIS — R10.13 DYSPEPSIA: ICD-10-CM

## 2021-06-10 PROCEDURE — 99214 OFFICE O/P EST MOD 30 MIN: CPT | Performed by: NURSE PRACTITIONER

## 2021-06-10 NOTE — PROGRESS NOTES
"Chief Complaint   Patient presents with   • Abdominal Pain     HPI    William Choi is a  61 y.o. male here for a follow up visit for abdominal pain.    This patient follows with Dr. Hernadez, new to me.    This patient is status post endoscopic evaluation on 1/4/2021 which are reviewed as follows:    EGD with irregular Z-line, normal mucosa in the entire esophagus, multiple gastric polyps, erythematous mucosa in the antrum.  Pathology with mild gastritis, gastric polyps benign.    On visit today tells me he only had minimal improvement with PPI therapy.  He still taking Protonix 40 mg once daily in the morning 30 minutes before eating breakfast.  Initially epigastric pain has moved down to encompass the upper abdomen distributed like a band seems to correlate with eating generally seems to occur in the morning.  No nausea, vomiting, dysphagia, odynophagia.  Frequently gets indigestion and \"upset stomach.\"  He does admit to dietary indiscretions.  He avoids NSAIDs.    No diarrhea, constipation, or rectal bleeding.  He is due for screening colonoscopy.  Reported normal colonoscopy in 2015 with plans for a follow-up 5 years later due to family history of colon cancer.  Patient reporting colon cancer in his maternal grandmother.    He still has his gallbladder.  Gallbladder ultrasound last fall normal.  Past Medical History:   Diagnosis Date   • Essential hypertension 5/3/2016   • Substernal chest pain    • Tinnitus        Past Surgical History:   Procedure Laterality Date   • COLONOSCOPY  2015   • ENDOSCOPY N/A 1/4/2021    Procedure: ESOPHAGOGASTRODUODENOSCOPY with biopsy of gastric polyps, and biopsies,;  Surgeon: Carlos Alberto Hernadez MD;  Location: [facility];  Service: Gastroenterology;  Laterality: N/A;  dyspepsiapost:  gastric polyps,    • INGUINAL HERNIA REPAIR Left    • VASECTOMY         Scheduled Meds:  Outpatient Encounter Medications as of 6/10/2021   Medication Sig Dispense Refill   • amLODIPine " (NORVASC) 10 MG tablet Take 1 tablet by mouth Daily. 90 tablet 1   • Multiple Vitamins-Minerals (MENS 50+ MULTI VITAMIN/MIN PO) Take 1 tablet by mouth Daily.     • pantoprazole (PROTONIX) 40 MG EC tablet Take 1 tablet by mouth Daily. 90 tablet 3   • Loratadine (CLARITIN PO) Take 1 tablet by mouth Daily.       No facility-administered encounter medications on file as of 6/10/2021.       Continuous Infusions:No current facility-administered medications for this visit.      PRN Meds:.    No Known Allergies    Social History     Socioeconomic History   • Marital status:      Spouse name: Not on file   • Number of children: Not on file   • Years of education: Not on file   • Highest education level: Not on file   Tobacco Use   • Smoking status: Never Smoker   • Smokeless tobacco: Never Used   Substance and Sexual Activity   • Alcohol use: Yes     Alcohol/week: 2.0 standard drinks     Types: 2 Cans of beer per week   • Drug use: No       Family History   Problem Relation Age of Onset   • COPD Mother    • Aplastic anemia Father    • Hypertension Father    • Kidney failure Father    • Prostate cancer Father    • Hypertension Brother    • Colon cancer Paternal Great-Grandfather    • Malig Hyperthermia Neg Hx        Review of Systems   Constitutional: Negative for activity change, appetite change, fatigue, fever and unexpected weight change.   HENT: Negative for trouble swallowing.    Respiratory: Negative for apnea, cough, choking, chest tightness, shortness of breath and wheezing.    Cardiovascular: Negative for chest pain, palpitations and leg swelling.   Gastrointestinal: Positive for abdominal pain. Negative for abdominal distention, anal bleeding, blood in stool, constipation, diarrhea, nausea, rectal pain and vomiting.       There were no vitals filed for this visit.    Physical Exam  Constitutional:       Appearance: He is well-developed.   Abdominal:      General: Bowel sounds are normal. There is no  distension.      Palpations: Abdomen is soft. There is no mass.      Tenderness: There is no abdominal tenderness. There is no guarding.      Hernia: No hernia is present.   Musculoskeletal:         General: Normal range of motion.   Skin:     General: Skin is warm and dry.      Capillary Refill: Capillary refill takes less than 2 seconds.   Neurological:      Mental Status: He is alert and oriented to person, place, and time.   Psychiatric:         Behavior: Behavior normal.       Assessment    Upper abdominal pain  Dyspepsia  Screening for colon cancer  Family history of colon cancer    Plan    Arrange HIDA scan to rule out biliary dyskinesia as origin of patient's continued upper GI complaints.  Continue Protonix 40 mg p.o. daily.  Strict antireflux dietary precautions.  Trial of FD guard 1 to 2 tablets at bedtime to see if we can improve a.m. symptoms.  Samples provided to the patient.  Colonoscopy for screening purposes later this summer with Dr. Hernadez.  Further recommendations and follow-up pending HIDA scan results.

## 2021-06-17 ENCOUNTER — TELEPHONE (OUTPATIENT)
Dept: GASTROENTEROLOGY | Facility: CLINIC | Age: 61
End: 2021-06-17

## 2021-06-17 NOTE — TELEPHONE ENCOUNTER
Left message on VM advising patient may use Ensure for HIDA test. Advised to call back for questions.

## 2021-06-17 NOTE — TELEPHONE ENCOUNTER
----- Message from Mila Sanchez sent at 6/17/2021  3:29 PM EDT -----  Regarding: valente diez  Contact: [phone]  [facility] wants [facility] know if ensure can be used.

## 2021-07-07 ENCOUNTER — HOSPITAL ENCOUNTER (OUTPATIENT)
Dept: NUCLEAR MEDICINE | Facility: HOSPITAL | Age: 61
Discharge: HOME | End: 2021-07-07

## 2021-07-07 DIAGNOSIS — R10.13 DYSPEPSIA: ICD-10-CM

## 2021-07-07 DIAGNOSIS — R10.10 PAIN OF UPPER ABDOMEN: ICD-10-CM

## 2021-07-07 PROCEDURE — 78226 HEPATOBILIARY SYSTEM IMAGING: CPT

## 2021-07-07 PROCEDURE — A9537 TC99M MEBROFENIN: HCPCS | Performed by: NURSE PRACTITIONER

## 2021-07-07 PROCEDURE — 0 TECHNETIUM TC 99M MEBROFENIN KIT: Performed by: NURSE PRACTITIONER

## 2021-07-07 RX ORDER — KIT FOR THE PREPARATION OF TECHNETIUM TC 99M MEBROFENIN 45 MG/10ML
1 INJECTION, POWDER, LYOPHILIZED, FOR SOLUTION INTRAVENOUS
Status: COMPLETED | OUTPATIENT
Start: 2021-07-07 | End: 2021-07-07

## 2021-07-07 RX ADMIN — MEBROFENIN 1 DOSE: 45 INJECTION, POWDER, LYOPHILIZED, FOR SOLUTION INTRAVENOUS at 10:05

## 2021-07-07 NOTE — PROGRESS NOTES
Let  know his HIDA scan is normal at 43%, greater than 35% considered normal.  Is he feeling better?

## 2021-07-09 ENCOUNTER — TELEPHONE (OUTPATIENT)
Dept: GASTROENTEROLOGY | Facility: CLINIC | Age: 61
End: 2021-07-09

## 2021-07-09 NOTE — TELEPHONE ENCOUNTER
Called pt and advised of Funmilayo ADAMES's note.  Pt verbalized understanding.    Pt states he is not feeling any better, his pain feels like a dull ache almost constantly in his stomach.  His egd/colonoscopy is scheduled for 7/14    Advised will send an update to ROSANGELA Mello.

## 2021-07-12 NOTE — TELEPHONE ENCOUNTER
Called pt and left a msg on identified vm advised of Funmilayo ADAMES's note. Advised to call back if he has questions.

## 2021-07-14 ENCOUNTER — ANESTHESIA (OUTPATIENT)
Dept: GASTROENTEROLOGY | Facility: HOSPITAL | Age: 61
End: 2021-07-14

## 2021-07-14 ENCOUNTER — HOSPITAL ENCOUNTER (OUTPATIENT)
Facility: HOSPITAL | Age: 61
Setting detail: HOSPITAL OUTPATIENT SURGERY
Discharge: HOME | End: 2021-07-14
Attending: INTERNAL MEDICINE | Admitting: INTERNAL MEDICINE

## 2021-07-14 ENCOUNTER — ANESTHESIA EVENT (OUTPATIENT)
Dept: GASTROENTEROLOGY | Facility: HOSPITAL | Age: 61
End: 2021-07-14

## 2021-07-14 VITALS
RESPIRATION RATE: 16 BRPM | HEIGHT: 67 IN | OXYGEN SATURATION: 98 % | SYSTOLIC BLOOD PRESSURE: 129 MMHG | BODY MASS INDEX: 28.49 KG/M2 | TEMPERATURE: 97.8 F | HEART RATE: 74 BPM | DIASTOLIC BLOOD PRESSURE: 76 MMHG | WEIGHT: 181.5 LBS

## 2021-07-14 DIAGNOSIS — Z12.11 SCREENING FOR COLON CANCER: ICD-10-CM

## 2021-07-14 DIAGNOSIS — Z80.0 FAMILY HISTORY OF COLON CANCER: ICD-10-CM

## 2021-07-14 PROCEDURE — 25010000002 PROPOFOL 10 MG/ML EMULSION: Performed by: ANESTHESIOLOGY

## 2021-07-14 PROCEDURE — S0260 H&P FOR SURGERY: HCPCS | Performed by: INTERNAL MEDICINE

## 2021-07-14 PROCEDURE — 45380 COLONOSCOPY AND BIOPSY: CPT | Performed by: INTERNAL MEDICINE

## 2021-07-14 PROCEDURE — 88305 TISSUE EXAM BY PATHOLOGIST: CPT | Performed by: INTERNAL MEDICINE

## 2021-07-14 PROCEDURE — 45385 COLONOSCOPY W/LESION REMOVAL: CPT | Performed by: INTERNAL MEDICINE

## 2021-07-14 RX ORDER — SODIUM CHLORIDE, SODIUM LACTATE, POTASSIUM CHLORIDE, CALCIUM CHLORIDE 600; 310; 30; 20 MG/100ML; MG/100ML; MG/100ML; MG/100ML
30 INJECTION, SOLUTION INTRAVENOUS CONTINUOUS PRN
Status: DISCONTINUED | OUTPATIENT
Start: 2021-07-14 | End: 2021-07-14 | Stop reason: HOSPADM

## 2021-07-14 RX ORDER — PROPOFOL 10 MG/ML
VIAL (ML) INTRAVENOUS AS NEEDED
Status: DISCONTINUED | OUTPATIENT
Start: 2021-07-14 | End: 2021-07-14 | Stop reason: SURG

## 2021-07-14 RX ORDER — PROPOFOL 10 MG/ML
VIAL (ML) INTRAVENOUS CONTINUOUS PRN
Status: DISCONTINUED | OUTPATIENT
Start: 2021-07-14 | End: 2021-07-14 | Stop reason: SURG

## 2021-07-14 RX ORDER — PROMETHAZINE HYDROCHLORIDE 25 MG/1
25 SUPPOSITORY RECTAL ONCE AS NEEDED
Status: DISCONTINUED | OUTPATIENT
Start: 2021-07-14 | End: 2021-07-14 | Stop reason: HOSPADM

## 2021-07-14 RX ORDER — PROMETHAZINE HYDROCHLORIDE 25 MG/1
25 TABLET ORAL ONCE AS NEEDED
Status: DISCONTINUED | OUTPATIENT
Start: 2021-07-14 | End: 2021-07-14 | Stop reason: HOSPADM

## 2021-07-14 RX ORDER — LIDOCAINE HYDROCHLORIDE 20 MG/ML
INJECTION, SOLUTION INFILTRATION; PERINEURAL AS NEEDED
Status: DISCONTINUED | OUTPATIENT
Start: 2021-07-14 | End: 2021-07-14 | Stop reason: SURG

## 2021-07-14 RX ADMIN — PROPOFOL 160 MCG/KG/MIN: 10 INJECTION, EMULSION INTRAVENOUS at 09:54

## 2021-07-14 RX ADMIN — PROPOFOL 100 MG: 10 INJECTION, EMULSION INTRAVENOUS at 09:53

## 2021-07-14 RX ADMIN — LIDOCAINE HYDROCHLORIDE 40 MG: 20 INJECTION, SOLUTION INFILTRATION; PERINEURAL at 09:53

## 2021-07-14 RX ADMIN — SODIUM CHLORIDE, POTASSIUM CHLORIDE, SODIUM LACTATE AND CALCIUM CHLORIDE 30 ML/HR: 600; 310; 30; 20 INJECTION, SOLUTION INTRAVENOUS at 09:29

## 2021-07-14 NOTE — BRIEF OP NOTE
COLONOSCOPY FOR SCREENING  Progress Note    William Choi  7/14/2021    Pre-op Diagnosis:   Screening for colon cancer [Z12.11]  Family history of colon cancer [Z80.0]       Post-Op Diagnosis Codes:     * Screening for colon cancer [Z12.11]     * Family history of colon cancer [Z80.0]     * Colon polyps [K63.5]     * Diverticulosis [K57.90]     * Internal hemorrhoids [K64.8]    Procedure/CPT® Codes:        Procedure(s):  COLONOSCOPY into cecum/terminal ileum with polypectomy    Surgeon(s):  Carlos Alberto Hernadez MD    Anesthesia: Monitored Anesthesia Care    Staff:   Endo Technician: Emiliano Kaiser  Endo Nurse: Christina Shine RN         Estimated Blood Loss: minimal    Urine Voided: * No values recorded between 7/14/2021  9:45 AM and 7/14/2021 10:16 AM *    Specimens:                Specimens     ID Source Type Tests Collected By Collected At Frozen?    A Large Intestine, Cecum Polyp · TISSUE PATHOLOGY EXAM   Carlos Alberto Hernadez MD 7/14/21 1003     Comment: Cold biopsy    This specimen was not marked as sent.    B Large Intestine, Left / Descending Colon Polyp · TISSUE PATHOLOGY EXAM   Carlos Alberto Hernadez MD 7/14/21 1007     Comment: Cold snare    This specimen was not marked as sent.    C Large Intestine, Sigmoid Colon Polyp · TISSUE PATHOLOGY EXAM   Carlos Alberto Hernadez MD 7/14/21 1012     Comment: Cold snare    This specimen was not marked as sent.                Drains: * No LDAs found *    Findings: Colonoscopy to terminal ileum with normal ileum mucosa.  Colon polyp in the cecum descending and sigmoid colon.  Polyps removed with cold biopsy and cold snare.  Sigmoid diverticulosis and internal hemorrhoids were noted.    Complications: None          Carlos Alberto Hernadez MD     Date: 7/14/2021  Time: 10:16 EDT

## 2021-07-14 NOTE — DISCHARGE INSTRUCTIONS
Colonoscopy, Adult, Care After  This sheet gives you information about how to care for yourself after your procedure. Your health care provider may also give you more specific instructions. If you have problems or questions, contact your health care provider.  What can I expect after the procedure?  After the procedure, it is common to have:  · A small amount of blood in your stool for 24 hours after the procedure.  · Some gas.  · Mild cramping or bloating of your abdomen.  Follow these instructions at home:  Eating and drinking    · Drink enough fluid to keep your urine pale yellow.  · Follow instructions from your health care provider about eating or drinking restrictions.  · Resume your normal diet as instructed by your health care provider. Avoid heavy or fried foods that are hard to digest.  Activity  · Rest as told by your health care provider.  · Avoid sitting for a long time without moving. Get up to take short walks every 1-2 hours. This is important to improve blood flow and breathing. Ask for help if you feel weak or unsteady.  · Return to your normal activities as told by your health care provider. Ask your health care provider what activities are safe for you.  Managing cramping and bloating    · Try walking around when you have cramps or feel bloated.  · Apply heat to your abdomen as told by your health care provider. Use the heat source that your health care provider recommends, such as a moist heat pack or a heating pad.  ? Place a towel between your skin and the heat source.  ? Leave the heat on for 20-30 minutes.  ? Remove the heat if your skin turns bright red. This is especially important if you are unable to feel pain, heat, or cold. You may have a greater risk of getting burned.  General instructions  · If you were given a sedative during the procedure, it can affect you for several hours. Do not drive or operate machinery until your health care provider says that it is safe.  · For the first  24 hours after the procedure:  ? Do not sign important documents.  ? Do not drink alcohol.  ? Do your regular daily activities at a slower pace than normal.  ? Eat soft foods that are easy to digest.  · Take over-the-counter and prescription medicines only as told by your health care provider.  · Keep all follow-up visits as told by your health care provider. This is important.  Contact a health care provider if:  · You have blood in your stool 2-3 days after the procedure.  Get help right away if you have:  · More than a small spotting of blood in your stool.  · Large blood clots in your stool.  · Swelling of your abdomen.  · Nausea or vomiting.  · A fever.  · Increasing pain in your abdomen that is not relieved with medicine.  Summary  · After the procedure, it is common to have a small amount of blood in your stool. You may also have mild cramping and bloating of your abdomen.  · If you were given a sedative during the procedure, it can affect you for several hours. Do not drive or operate machinery until your health care provider says that it is safe.  · Get help right away if you have a lot of blood in your stool, nausea or vomiting, a fever, or increased pain in your abdomen.  This information is not intended to replace advice given to you by your health care provider. Make sure you discuss any questions you have with your health care provider.  Document Revised: 12/11/2020 Document Reviewed: 07/13/2020  [employer] Patient Education © 2021 [employer]

## 2021-07-14 NOTE — ANESTHESIA PREPROCEDURE EVALUATION
Anesthesia Evaluation     Patient summary reviewed and Nursing notes reviewed   NPO Solid Status: > 8 hours  NPO Liquid Status: > 2 hours           Airway   Mallampati: I  TM distance: >3 FB  Neck ROM: full  No difficulty expected  Dental - normal exam     Pulmonary - negative pulmonary ROS and normal exam   Cardiovascular - normal exam  Exercise tolerance: good (4-7 METS)    (+) hypertension well controlled less than 2 medications,       Neuro/Psych- negative ROS  GI/Hepatic/Renal/Endo - negative ROS     Musculoskeletal (-) negative ROS    Abdominal  - normal exam    Bowel sounds: normal.   Substance History - negative use     OB/GYN negative ob/gyn ROS         Other                        Anesthesia Plan    ASA 2     MAC       Anesthetic plan, all risks, benefits, and alternatives have been provided, discussed and informed consent has been obtained with: patient.

## 2021-07-14 NOTE — ANESTHESIA POSTPROCEDURE EVALUATION
Patient: William Choi    Procedure Summary     Date: 07/14/21 Room / Location:  [facility]    Anesthesia Start: 0948 Anesthesia Stop: 1020    Procedure: COLONOSCOPY into cecum/terminal ileum with polypectomy (N/A ) Diagnosis:       Screening for colon cancer      Family history of colon cancer      Colon polyps      Diverticulosis      Internal hemorrhoids      (Screening for colon cancer [Z12.11])      (Family history of colon cancer [Z80.0])    Surgeons: Carlos Alberto Hernadez MD Provider: Addison Solitario MD    Anesthesia Type: MAC ASA Status: 2          Anesthesia Type: MAC    Vitals  Vitals Value Taken Time   /79 07/14/21 1017   Temp 36.6 °C (97.8 °F) 07/14/21 1017   Pulse 71 07/14/21 1017   Resp 16 07/14/21 1017   SpO2 98 % 07/14/21 1017           Post Anesthesia Care and Evaluation    Patient location during evaluation: bedside  Patient participation: complete - patient participated  Level of consciousness: awake  Pain management: adequate  Airway patency: patent  Anesthetic complications: No anesthetic complications  PONV Status: none  Cardiovascular status: acceptable  Respiratory status: acceptable  Hydration status: acceptable  Post Neuraxial Block status: Motor and sensory function returned to baseline

## 2021-07-14 NOTE — H&P
[facility]  Pre Procedure History & Physical    Chief Complaint:   Colonoscopy screening    Subjective     HPI:   Patient 61-year-old male with history of hypertension, substernal chest pain with family history significant for great grandfather with colon cancer presenting for screening.  Patient with dyspeptic symptoms unrelated to bowel movements here for colonoscopy.    Past Medical History:   Past Medical History:   Diagnosis Date   • Essential hypertension 5/3/2016   • Substernal chest pain    • Tinnitus        Past Surgical History:  Past Surgical History:   Procedure Laterality Date   • COLONOSCOPY  2015   • ENDOSCOPY N/A 1/4/2021    Procedure: ESOPHAGOGASTRODUODENOSCOPY with biopsy of gastric polyps, and biopsies,;  Surgeon: Carlos Alberto Hernadez MD;  Location: [facility] ENDOSCOPY;  Service: Gastroenterology;  Laterality: N/A;  dyspepsiapost:  gastric polyps,    • INGUINAL HERNIA REPAIR Left    • VASECTOMY         Family History:  Family History   Problem Relation Age of Onset   • COPD Mother    • Aplastic anemia Father    • Hypertension Father    • Kidney failure Father    • Prostate cancer Father    • Hypertension Brother    • Colon cancer Paternal Great-Grandfather    • Malig Hyperthermia Neg Hx        Social History:   reports that he has never smoked. He has never used smokeless tobacco. He reports current alcohol use of about 2.0 standard drinks of alcohol per week. He reports that he does not use drugs.    Medications:   Medications Prior to Admission   Medication Sig Dispense Refill Last Dose   • amLODIPine (NORVASC) 10 MG tablet Take 1 tablet by mouth Daily. 90 tablet 1 7/14/2021 at Unknown time   • Multiple Vitamins-Minerals (MENS 50+ MULTI VITAMIN/MIN PO) Take 1 tablet by mouth Daily.   Past Week at Unknown time   • NON FORMULARY FD GUARD   7/12/2021   • pantoprazole (PROTONIX) 40 MG EC tablet Take 1 tablet by mouth Daily. 90 tablet 3 7/13/2021 at Unknown time   • Loratadine  "(CLARITIN PO) Take 1 tablet by mouth Daily.          Allergies:  Patient has no known allergies.    ROS:    Pertinent items are noted in HPI     Objective     Blood pressure 123/78, pulse 65, temperature 98.2 °F (36.8 °C), temperature source Oral, resp. rate 11, height 170.2 cm (67\"), weight 82.3 kg (181 lb 8 oz), SpO2 98 %.    Physical Exam   Constitutional: Pt is oriented to person, place, and time and well-developed, well-nourished, and in no distress.   Mouth/Throat: Oropharynx is clear and moist.   Neck: Normal range of motion.   Cardiovascular: Normal rate, regular rhythm and normal heart sounds.    Pulmonary/Chest: Effort normal and breath sounds normal.   Abdominal: Soft. Nontender  Skin: Skin is warm and dry.   Psychiatric: Mood, memory, affect and judgment normal.     Assessment/Plan     Diagnosis:  Colonoscopy screening    Anticipated Surgical Procedure:  Colonoscopy    The risks, benefits, and alternatives of this procedure have been discussed with the patient or the responsible party- the patient understands and agrees to proceed.                                                          "

## 2021-07-15 LAB
CYTO UR: NORMAL
LAB AP CASE REPORT: NORMAL
LAB AP CLINICAL INFORMATION: NORMAL
PATH REPORT.FINAL DX SPEC: NORMAL
PATH REPORT.GROSS SPEC: NORMAL

## 2021-07-16 RX ORDER — NORTRIPTYLINE HYDROCHLORIDE 10 MG/1
10 CAPSULE ORAL NIGHTLY
Qty: 30 CAPSULE | Refills: 11 | Status: SHIPPED | OUTPATIENT
Start: 2021-07-16

## 2021-08-04 ENCOUNTER — TELEPHONE (OUTPATIENT)
Dept: GASTROENTEROLOGY | Facility: CLINIC | Age: 61
End: 2021-08-04

## 2021-08-04 NOTE — TELEPHONE ENCOUNTER
----- Message from Carlos Alberto Hernadez MD sent at 8/1/2021  4:47 PM EDT -----  Polyp benign repeat: 5 years as discussed

## 2022-09-20 NOTE — PROGRESS NOTES
"Subjective   William Choi is a 56 y.o. male who presents today for:    Hypertension (8 month f/u) and Leg Pain (Right leg pain)    Hypertension   This is a chronic problem. Episode onset: 4/2016. The problem is controlled. Pertinent negatives include no chest pain, headaches or palpitations. Past treatments include calcium channel blockers. The current treatment provides significant improvement.   Leg Pain    Pertinent negatives include no numbness.   Onset in June, 2016, of LBP with radiation to the right leg, to the calf.  Persistent, constant, but better with APAP 5000 prn and Aleve 220 prn and stretching.  Relief was partial and temporary.  He denies any weakness.  He denies trauma, recent or remote.      Mr. Choi  reports that he has never smoked. He has never used smokeless tobacco. He reports that he drinks about 1.2 oz of alcohol per week  He reports that he does not use illicit drugs.         Current Outpatient Prescriptions:   •  amLODIPine (NORVASC) 10 MG tablet, Take 1 tablet by mouth daily., Disp: 90 tablet, Rfl: 3      The following portions of the patient's history were reviewed and updated as appropriate: allergies, current medications, past social history and problem list.    Review of Systems   Constitutional: Negative for diaphoresis.   Eyes: Negative for visual disturbance.   Respiratory: Negative for cough and chest tightness.    Cardiovascular: Positive for leg swelling (intermittent). Negative for chest pain and palpitations.   Gastrointestinal: Negative for abdominal pain.   Musculoskeletal: Negative for myalgias.   Neurological: Negative for dizziness, syncope, numbness and headaches.   Hematological: Does not bruise/bleed easily.         Objective   Vitals:    01/31/17 1423   BP: 118/82   BP Location: Right arm   Patient Position: Sitting   Cuff Size: Adult   Pulse: 93   SpO2: 98%   Weight: 195 lb 6.4 oz (88.6 kg)   Height: 68\" (172.7 cm)     Physical Exam   Constitutional: He is " oriented to person, place, and time. He appears well-developed and well-nourished. No distress.   Cardiovascular: Normal rate and regular rhythm.    Abdominal: There is no tenderness.   Neurological: He is alert and oriented to person, place, and time. He has normal strength. He displays abnormal reflex. He displays no tremor. He exhibits normal muscle tone. Coordination and gait normal.   Reflex Scores:       Patellar reflexes are 0 on the right side and 2+ on the left side.       Achilles reflexes are 2+ on the right side and 2+ on the left side.  (-) SLR and (-) Bowstring Sign bilat       Assessment/Plan    was seen today for hypertension and leg pain.    Diagnoses and all orders for this visit:    Essential hypertension    Chronic right-sided low back pain with right-sided sciatica  Comments:  Stretches as demonstrated today. MELOXICAM 15 mg daily for a month.  Continue the stretches thereafter.  Orders:  -     meloxicam (MOBIC) 15 MG tablet; Take 1 tablet by mouth Daily.    Blood pressure is well controlled on the amlodipine 10 mg daily.  We will make no changes today.  He will need a BMP at his next office visit in 6 months.  He asked about being able to get off the medicine, and I counseled him to get his weight to 180 pounds through diet changes and we will contemplate stopping his medicine then.    For the low back pain with right-sided radicular symptoms in the absence of deep tendon reflexes at the right patella, I have encouraged him to do the stretches for his back.  He should do them faithfully, take the meloxicam daily for the next month, and follow-up in 2 months.  He may cancel that appointment if his back pain is resolved.  He may contact us prior to the intensive that we can arrange for imaging studies if his symptoms escalate.   Severino Cox (DO)  Orthopaedic Surgery  [address] [place] [address]  Phone: [phone]  Fax: [phone]  Follow Up Time:

## (undated) DEVICE — SENSR O2 OXIMAX FNGR A/ 18IN NONSTR

## (undated) DEVICE — THE SINGLE USE ETRAP – POLYP TRAP IS USED FOR SUCTION RETRIEVAL OF ENDOSCOPICALLY REMOVED POLYPS.: Brand: ETRAP

## (undated) DEVICE — FRCP BX RADJAW4 NDL 2.8 240CM LG OG BX40

## (undated) DEVICE — LN SMPL CO2 SHTRM SD STREAM W/M LUER

## (undated) DEVICE — TUBING, SUCTION, 1/4" X 10', STRAIGHT: Brand: MEDLINE

## (undated) DEVICE — ADAPT CLN BIOGUARD AIR/H2O DISP

## (undated) DEVICE — CANN O2 ETCO2 FITS ALL CONN CO2 SMPL A/ 7IN DISP LF

## (undated) DEVICE — THE TORRENT IRRIGATION SCOPE CONNECTOR IS USED WITH THE TORRENT IRRIGATION TUBING TO PROVIDE IRRIGATION FLUIDS SUCH AS STERILE WATER DURING GASTROINTESTINAL ENDOSCOPIC PROCEDURES WHEN USED IN CONJUNCTION WITH AN IRRIGATION PUMP (OR ELECTROSURGICAL UNIT).: Brand: TORRENT

## (undated) DEVICE — KT ORCA ORCAPOD DISP STRL

## (undated) DEVICE — BITEBLOCK OMNI BLOC

## (undated) DEVICE — SINGLE-USE BIOPSY FORCEPS: Brand: RADIAL JAW 4

## (undated) DEVICE — SNAR POLYP CAPTIVATOR RND STFF 2.4 240CM 10MM 1P/U